# Patient Record
Sex: FEMALE | Race: WHITE
[De-identification: names, ages, dates, MRNs, and addresses within clinical notes are randomized per-mention and may not be internally consistent; named-entity substitution may affect disease eponyms.]

---

## 2020-06-02 ENCOUNTER — HOSPITAL ENCOUNTER (OUTPATIENT)
Dept: HOSPITAL 47 - LABWHC1 | Age: 65
Discharge: HOME | End: 2020-06-02
Payer: MEDICARE

## 2020-06-02 DIAGNOSIS — Z03.818: Primary | ICD-10-CM

## 2020-06-02 PROCEDURE — 36415 COLL VENOUS BLD VENIPUNCTURE: CPT

## 2020-06-02 PROCEDURE — 86769 SARS-COV-2 COVID-19 ANTIBODY: CPT

## 2020-12-09 ENCOUNTER — HOSPITAL ENCOUNTER (OUTPATIENT)
Dept: HOSPITAL 47 - RADMAMWWP | Age: 65
Discharge: HOME | End: 2020-12-09
Attending: INTERNAL MEDICINE
Payer: MEDICARE

## 2020-12-09 DIAGNOSIS — Z12.31: Primary | ICD-10-CM

## 2020-12-09 PROCEDURE — 77067 SCR MAMMO BI INCL CAD: CPT

## 2020-12-18 ENCOUNTER — HOSPITAL ENCOUNTER (OUTPATIENT)
Dept: HOSPITAL 47 - RADBDWWP | Age: 65
Discharge: HOME | End: 2020-12-18
Attending: INTERNAL MEDICINE
Payer: MEDICARE

## 2020-12-18 DIAGNOSIS — M85.80: Primary | ICD-10-CM

## 2020-12-18 DIAGNOSIS — M85.9: ICD-10-CM

## 2020-12-18 PROCEDURE — 77080 DXA BONE DENSITY AXIAL: CPT

## 2020-12-18 NOTE — BD
EXAMINATION TYPE: Axial Bone Density

 

DATE OF EXAM: 12/18/2020

 

COMPARISON: NONE

 

CLINICAL HISTORY:

 

Height:  5 FT 2 IN

Weight:  166

 

FRAX RISK QUESTIONS:

Alcohol (3 or more units per day):  NO

Family History (Parent hip fracture):  NO

Glucocorticoids (More than 3mos):  NO

           (Ex: prednisone, prednisolone, methylprednisolone, dexamethasone, and hydrocortisone).    
     

History of Fracture in Adulthood: NO

Secondary Osteoporosis:

  1.  Type 1 Diabetes: NO

  2.  Hyperthyroidism: NO

  3.  Menopause before 45: NO

  4.  Malnutrition: NO

  5.  Chronic liver disease: NO

Rheumatoid Arthritis: NO

Current Tobacco Use: NO

 

RISK FACTORS 

HISTORY OF: 

Family History of Osteoporosis: YES

Active: YES

Diet low in dairy products/other sources of calcium:  NO

Postmenopausal woman: AGE 45

Take estrogen and/or progesterone medications: TOOK HRT FOR SEV YEARS AFTER AGE 45 UNSURE HOW LONG

Lost more than 2 inches in height since high school: NO

 

 

MEDICATIONS: 

Additional Medications: BLOOD PRESSURE MEDS,  MELOXICAM, CYCLOBEZAPRINE

 

 

Additional History:

 

 

EXAM MEASUREMENTS: 

Bone mineral densitometry was performed using the [employer] System.

Bone mineral density as measured about the Lumbar spine is:  

----- L1-L4(G/cm2): 1.100

T Score Values are as follows:

----- L2: -0.9

----- L3: -0.6

----- L4: -1.7

----- L1-L4: -0.7

BASELINE

Bone mineral density about the R hip (g/cm2): 0.800

Bone mineral density about the L hip (g/cm2): 0.831

T Score values are as follows:

-----R Neck: -1.7

-----L Neck: -1.5

-----R Total: -0.7

-----L Total: -0.7

BASELINE

 

IMPRESSION:

Osteopenia (T Score between -2.5 and -1).

 

There is slightly increased risk of fracture and the patient may be considered 

for treatment. 

 

Re-Screen 2-5 years.

 

NOTE:  T-SCORE=SD OF THE YOUNG ADULT MEAN.

## 2020-12-21 NOTE — MM
Reason for exam: screening  (asymptomatic).

Last mammogram was performed 2 years and 11 months ago.



History:

Patient is postmenopausal.



Physical Findings:

A clinical breast exam by your physician is recommended on an annual basis and 

results should be correlated with mammographic findings.



MG Screening Mammo w CAD

Bilateral CC and MLO view(s) were taken.

Prior study comparison: January 19, 2018, mammogram, performed at [place].  

January 18, 2018, mammogram, performed at [place].

The breast tissue is heterogeneously dense. This may lower the sensitivity of 

mammography.  Stable benign calcifications. There is no discrete abnormality.  No 

significant changes when compared with prior studies.





ASSESSMENT: Benign, BI-RAD 2



RECOMMENDATION:

Routine screening mammogram of both breasts in 1 year.

## 2021-08-06 ENCOUNTER — HOSPITAL ENCOUNTER (OUTPATIENT)
Dept: HOSPITAL 47 - LABWHC1 | Age: 66
Discharge: HOME | End: 2021-08-06
Attending: INTERNAL MEDICINE
Payer: MEDICARE

## 2021-08-06 DIAGNOSIS — Z01.84: Primary | ICD-10-CM

## 2021-08-06 PROCEDURE — 36415 COLL VENOUS BLD VENIPUNCTURE: CPT

## 2021-08-06 PROCEDURE — 86769 SARS-COV-2 COVID-19 ANTIBODY: CPT

## 2021-11-05 ENCOUNTER — HOSPITAL ENCOUNTER (OUTPATIENT)
Dept: HOSPITAL 47 - PROCWHC3 | Age: 66
End: 2021-11-05
Attending: FAMILY MEDICINE
Payer: MEDICARE

## 2021-11-05 VITALS — RESPIRATION RATE: 16 BRPM

## 2021-11-05 VITALS — DIASTOLIC BLOOD PRESSURE: 74 MMHG | TEMPERATURE: 98.8 F | HEART RATE: 63 BPM | SYSTOLIC BLOOD PRESSURE: 117 MMHG

## 2021-11-05 DIAGNOSIS — Z88.8: ICD-10-CM

## 2021-11-05 DIAGNOSIS — Z88.1: ICD-10-CM

## 2021-11-05 DIAGNOSIS — U07.1: Primary | ICD-10-CM

## 2021-11-05 PROCEDURE — 96361 HYDRATE IV INFUSION ADD-ON: CPT

## 2021-11-05 PROCEDURE — 96365 THER/PROPH/DIAG IV INF INIT: CPT

## 2022-03-31 ENCOUNTER — HOSPITAL ENCOUNTER (OUTPATIENT)
Dept: HOSPITAL 47 - LABWHC1 | Age: 67
Discharge: HOME | End: 2022-03-31
Attending: INTERNAL MEDICINE
Payer: MEDICARE

## 2022-03-31 DIAGNOSIS — U07.1: Primary | ICD-10-CM

## 2022-03-31 PROCEDURE — 86769 SARS-COV-2 COVID-19 ANTIBODY: CPT

## 2022-03-31 PROCEDURE — 36415 COLL VENOUS BLD VENIPUNCTURE: CPT

## 2022-04-04 ENCOUNTER — HOSPITAL ENCOUNTER (EMERGENCY)
Dept: HOSPITAL 47 - EC | Age: 67
Discharge: HOME | End: 2022-04-04
Payer: MEDICARE

## 2022-04-04 VITALS — SYSTOLIC BLOOD PRESSURE: 125 MMHG | DIASTOLIC BLOOD PRESSURE: 77 MMHG | HEART RATE: 75 BPM | RESPIRATION RATE: 16 BRPM

## 2022-04-04 VITALS — TEMPERATURE: 97.8 F

## 2022-04-04 DIAGNOSIS — T78.49XA: Primary | ICD-10-CM

## 2022-04-04 DIAGNOSIS — I10: ICD-10-CM

## 2022-04-04 PROCEDURE — 99283 EMERGENCY DEPT VISIT LOW MDM: CPT

## 2022-04-04 PROCEDURE — 96374 THER/PROPH/DIAG INJ IV PUSH: CPT

## 2022-04-04 PROCEDURE — 96375 TX/PRO/DX INJ NEW DRUG ADDON: CPT

## 2022-04-04 NOTE — ED
General Adult HPI





- General


Chief complaint: Allergic Reaction


Stated complaint: allergic reaction


Time Seen by Provider: 22 11:30


Source: patient, RN notes reviewed, old records reviewed


Mode of arrival: ambulatory


Limitations: no limitations





- History of Present Illness


Initial comments: 





This is a 66-year-old female presents emergency Department stating that she's 

having ALLERGIC reaction.  Patient states yesterday after dinner she started 

getting itching on her hands and then noted her arms are itchy and the itching 

progressed to her back and her abdomen.  Patient noted hives on her arms back 

and abdomen.  Patient also stated that her lips are swollen is able to no longer

swollen today.  Patient states her throat feels scratchy and she can feel a 

different sensation in throat when she swallows but she's having no problems 

swallowing patient states she has a little tightness when she tries to breathe 

but she's not having any shortness of breath.  Patient denies having this type 

of ALLERGIC reaction before.  Patient states she has had no soaps lotions or 

foods route of the ordinary.





- Related Data


                                Home Medications











 Medication  Instructions  Recorded  Confirmed


 


Acetylcysteine [Nac] 500 mg PO DAILY 21


 


Alpha Lipoic Acid 50 mg PO DAILY 21


 


Ascorbic Acid [Vitamin C] 1,000 mg PO DAILY 21


 


B Complex W-C No.20/Folic Acid 1 mg PO DAILY 21





[Renal Caps Softgel]   


 


B12/Levomefolate Calcium/B-6 1 tab PO DAILY 21





[Foltx Tablet]   


 


Cholecalciferol (Vitamin D3) 250 mcg PO DAILY 21





[Vitamin D3 (125 MCG = 5,000 IU)]   


 


Fish Oil/Dha/Epa [Fish Oil 1,200 1 cap PO DAILY 21





mg Fish Oil]   


 


Folic Acid 0.8 mg PO DAILY 21


 


Garlic 1 tab PO DAILY 21


 


K2 M7    1 cap PO DAILY 21


 


L Cytraline   1 tab PO DAILY 21


 


Magnesium 200 mg PO DAILY 21


 


Red Yeast Rice 600 mg PO DAILY 21


 


Turmeric Root Extract [Turmeric] 500 mg PO DAILY 21


 


Ubidecarenone [Co Q-10] 100 mg PO DAILY 21


 


Vitamin A [Vitamin A (8,000 Units 2,400 mcg PO DAILY 21





= 2,400 MCG)]   


 


Vitamin E (Dl,Tocopheryl Acet) 200 unit PO DAILY 21





[Vitamin E]   


 


Zinc 50 mg PO DAILY 21


 


Cyclobenzaprine [Flexeril] 10 mg PO HS PRN 22


 


Naproxen Sodium [Aleve] 220 mg PO DAILY PRN 22


 


Quercetin 1 tab PO DAILY 22


 


Selenium 50 mcg PO DAILY 22


 


Verapamil HCl [Verapamil ER] 180 mg PO DAILY 22


 


Vit C/E/Zn/Coppr/Lutein/Zeaxan 1 cap PO DAILY 22





[Preservision Areds 2 Softgel]   








                                  Previous Rx's











 Medication  Instructions  Recorded


 


predniSONE [Deltasone] 40 mg PO DAILY #8 tab 22











                                    Allergies











Allergy/AdvReac Type Severity Reaction Status Date / Time


 


ampicillin Allergy  Anaphylaxis Verified 22 11:55


 


nickel AdvReac  Rash/Hives Verified 22 11:55














Review of Systems


ROS Statement: 


Those systems with pertinent positive or pertinent negative responses have been 

documented in the HPI.





ROS Other: All systems not noted in ROS Statement are negative.





Past Medical History


Past Medical History: Hypertension


History of Any Multi-Drug Resistant Organisms: None Reported


Past Surgical History:  Section


Additional Past Surgical History / Comment(s): LEFT SALPINGO OOPHERECTOMY


Smoking Status: Never smoker


Past Alcohol Use History: None Reported


Past Drug Use History: None Reported





General Exam





- General Exam Comments


Initial Comments: 





GENERAL:


Patient is well-developed and well-nourished.  Patient is nontoxic and well-

hydrated and is in mild distress.





ENT:


Neck is soft and supple.  No significant lymphadenopathy is noted.  Oropharynx 

is clear.  Moist mucous membranes.  Neck has full range of motion without 

eliciting any pain.  





EYES:


The sclera were anicteric and conjunctiva were pink and moist.  Extraocular 

movements were intact and pupils were equal round and reactive to light.  

Eyelids were unremarkable.





PULMONARY:


Unlabored respirations.  Good breath sounds bilaterally.  No audible rales 

rhonchi or wheezing was noted.





CARDIOVASCULAR:


There is a regular rate and rhythm without any murmurs gallops or rubs.  





ABDOMEN:


Soft and nontender with normal bowel sounds.  No palpable organomegaly was 

noted.  There is no palpable pulsatile mass.





SKIN:


Patient has hives on her arms abdomen and back.





NEUROLOGIC:


Patient is alert and oriented x3.  Cranial nerves II through XII are grossly 

intact.  Motor and sensory are also intact.  Normal speech, volume and content. 

 Symmetrical smile. 





MUSCULOSKELETAL:


Normal extremities with adequate strength and full range of motion.  





LYMPHATICS:


No significant lymphadenopathy is noted





PSYCHIATRIC:


Normal psychiatric evaluation. 


Limitations: no limitations





Course


                                   Vital Signs











  22





  11:22


 


Temperature 97.8 F


 


Pulse Rate 101 H


 


Respiratory 18





Rate 


 


Blood Pressure 120/76


 


O2 Sat by Pulse 98





Oximetry 














Medical Decision Making





- Medical Decision Making





Patient received Benadryl and Pepcid and Solu-Medrol in the hospital was doing 

considerably better after about an hour so patient will be discharged home with 

steroids.





Disposition


Clinical Impression: 


 Allergic reaction





Disposition: HOME SELF-CARE


Condition: Good


Instructions (If sedation given, give patient instructions):  General Allergic 

Reaction (ED)


Additional Instructions: 


Patient can take Benadryl when necessary every 6 hours





Patient should return to the emergency department as any difficulty breathing or

 shortness of breath.


Prescriptions: 


predniSONE [Deltasone] 40 mg PO DAILY #8 tab


Is patient prescribed a controlled substance at d/c from ED?: No


Referrals: 


Gonzalez Salter MD [Primary Care Provider] - 1-2 days


Time of Disposition: 13:06